# Patient Record
(demographics unavailable — no encounter records)

---

## 2024-10-29 NOTE — HISTORY OF PRESENT ILLNESS
[TextBox_4] : 74-year-old female with a history of pulmonary embolism.  She had lung cancer with adrenal mets and had immunotherapy.  She has a history of anemia as well.  She is using Lovenox twice daily.  She denies any shortness of breath cough or any other symptoms.  She is not on oxygen.

## 2024-10-29 NOTE — PHYSICAL EXAM
[No Acute Distress] : no acute distress [Normal Oropharynx] : normal oropharynx [Normal Appearance] : normal appearance [No Neck Mass] : no neck mass [Normal Rate/Rhythm] : normal rate/rhythm [Normal S1, S2] : normal s1, s2 [No Murmurs] : no murmurs [No Resp Distress] : no resp distress [Normal Palpation] : normal palpation [Clear to Auscultation Bilaterally] : clear to auscultation bilaterally [Normal to Percussion] : normal to percussion [No Abnormalities] : no abnormalities [Benign] : benign [Normal Gait] : normal gait [No Clubbing] : no clubbing [No Cyanosis] : no cyanosis [No Edema] : no edema [Normal Color/ Pigmentation] : normal color/ pigmentation [Normal Affect] : normal affect [TextBox_2] : overweight female

## 2024-10-29 NOTE — DISCUSSION/SUMMARY
[FreeTextEntry1] : This patient is stable status post pulmonary embolism.  She has been advised she will be on Lovenox indefinitely.  Advised follow-up with oncologist.  Advised vaccines.  I will see her in 6 months and do a PFT.  Time spent reviewing file, taking history , and examining patient: ___39_______ minutes

## 2024-10-29 NOTE — CONSULT LETTER
[Dear  ___] : Dear  [unfilled], [Consult Letter:] : I had the pleasure of evaluating your patient, [unfilled]. [Please see my note below.] : Please see my note below. [Consult Closing:] : Thank you very much for allowing me to participate in the care of this patient.  If you have any questions, please do not hesitate to contact me. [Sincerely,] : Sincerely, [FreeTextEntry2] : Manuel Little [FreeTextEntry3] :  Osmany Alonzo MD FACP FCCP

## 2025-03-31 NOTE — CONSULT LETTER
[Dear  ___] : Dear  [unfilled], [Consult Letter:] : I had the pleasure of evaluating your patient, [unfilled]. [Please see my note below.] : Please see my note below. [Consult Closing:] : Thank you very much for allowing me to participate in the care of this patient.  If you have any questions, please do not hesitate to contact me. [Sincerely,] : Sincerely, [FreeTextEntry2] : Manuel Little [FreeTextEntry3] :  Osmany Alonzo MD FACP FCCP Pulmonary Diseases and Critical Care Medicine. Chief, Pulmonary Diseases, [facility]

## 2025-03-31 NOTE — HISTORY OF PRESENT ILLNESS
[TextBox_4] : 74-year-old female for follow-up.  She denies any shortness of breath or chest pain.  She was told of Takotsubo cardiomyopathy.  She has right shoulder pain.

## 2025-03-31 NOTE — PHYSICAL EXAM
[No Acute Distress] : no acute distress [Normal Oropharynx] : normal oropharynx [Normal Appearance] : normal appearance [No Neck Mass] : no neck mass [Normal Rate/Rhythm] : normal rate/rhythm [Normal S1, S2] : normal s1, s2 [No Murmurs] : no murmurs [No Resp Distress] : no resp distress [Clear to Auscultation Bilaterally] : clear to auscultation bilaterally [No Abnormalities] : no abnormalities [Benign] : benign [No Clubbing] : no clubbing [No Cyanosis] : no cyanosis [1+ Pitting] : 1+ pitting [Normal Color/ Pigmentation] : normal color/ pigmentation [Normal Affect] : normal affect [TextBox_2] : overweight female

## 2025-03-31 NOTE — DISCUSSION/SUMMARY
[FreeTextEntry1] : Ms. Murillo is stable following prior pulmonary embolism.  She is on enoxaparin for life.  PFT was normal.  Advised to lose weight.  Follow-up with cardiology for her cardiomyopathy.  CT of chest.  I will see her again in 6 mos.   Time spent reviewing file, taking history , and examining patient: ____31______ minutes

## 2025-04-02 NOTE — HISTORY OF PRESENT ILLNESS
[Post-hospitalization from ___ Hospital] : Post-hospitalization from [unfilled] Hospital [Admitted on: ___] : The patient was admitted on [unfilled] [Discharged on ___] : discharged on [unfilled] [Discharge Summary] : discharge summary [Discharge Med List] : discharge medication list [Med Reconciliation] : medication reconciliation has been completed [Patient Contacted By: ____] : and contacted by [unfilled]